# Patient Record
Sex: MALE | Race: WHITE | URBAN - METROPOLITAN AREA
[De-identification: names, ages, dates, MRNs, and addresses within clinical notes are randomized per-mention and may not be internally consistent; named-entity substitution may affect disease eponyms.]

---

## 2022-05-11 ENCOUNTER — APPOINTMENT (OUTPATIENT)
Dept: GENERAL RADIOLOGY | Age: 57
End: 2022-05-11
Payer: MEDICAID

## 2022-05-11 ENCOUNTER — HOSPITAL ENCOUNTER (EMERGENCY)
Age: 57
Discharge: HOME | End: 2022-05-12
Attending: EMERGENCY MEDICINE
Payer: MEDICAID

## 2022-05-11 VITALS
HEIGHT: 72 IN | SYSTOLIC BLOOD PRESSURE: 143 MMHG | HEART RATE: 59 BPM | BODY MASS INDEX: 41.24 KG/M2 | OXYGEN SATURATION: 97 % | RESPIRATION RATE: 16 BRPM | DIASTOLIC BLOOD PRESSURE: 85 MMHG | WEIGHT: 304.5 LBS | TEMPERATURE: 98.4 F

## 2022-05-11 DIAGNOSIS — R73.9 HYPERGLYCEMIA: Primary | ICD-10-CM

## 2022-05-11 LAB
A/G RATIO: 1.7 (ref 1.1–2.2)
ALBUMIN SERPL-MCNC: 4.5 G/DL (ref 3.4–5)
ALP BLD-CCNC: 80 U/L (ref 40–129)
ALT SERPL-CCNC: 20 U/L (ref 10–40)
ANION GAP SERPL CALCULATED.3IONS-SCNC: 12 MMOL/L (ref 3–16)
AST SERPL-CCNC: 17 U/L (ref 15–37)
BASE EXCESS VENOUS: 0.4 MMOL/L (ref -3–3)
BASOPHILS ABSOLUTE: 0 K/UL (ref 0–0.2)
BASOPHILS RELATIVE PERCENT: 0.5 %
BETA-HYDROXYBUTYRATE: 0.1 MMOL/L (ref 0–0.27)
BILIRUB SERPL-MCNC: 0.6 MG/DL (ref 0–1)
BILIRUBIN URINE: NEGATIVE
BLOOD, URINE: NEGATIVE
BUN BLDV-MCNC: 12 MG/DL (ref 7–20)
CALCIUM SERPL-MCNC: 9.7 MG/DL (ref 8.3–10.6)
CARBOXYHEMOGLOBIN: 1.5 % (ref 0–1.5)
CHLORIDE BLD-SCNC: 95 MMOL/L (ref 99–110)
CHP ED QC CHECK: NORMAL
CLARITY: CLEAR
CO2: 24 MMOL/L (ref 21–32)
COLOR: YELLOW
CREAT SERPL-MCNC: 0.8 MG/DL (ref 0.9–1.3)
EOSINOPHILS ABSOLUTE: 0 K/UL (ref 0–0.6)
EOSINOPHILS RELATIVE PERCENT: 0.7 %
GFR AFRICAN AMERICAN: >60
GFR NON-AFRICAN AMERICAN: >60
GLUCOSE BLD-MCNC: 419 MG/DL
GLUCOSE BLD-MCNC: 419 MG/DL (ref 70–99)
GLUCOSE BLD-MCNC: 431 MG/DL (ref 70–99)
GLUCOSE URINE: >=1000 MG/DL
HCO3 VENOUS: 26 MMOL/L (ref 23–29)
HCT VFR BLD CALC: 52.3 % (ref 40.5–52.5)
HEMOGLOBIN: 17.9 G/DL (ref 13.5–17.5)
KETONES, URINE: NEGATIVE MG/DL
LEUKOCYTE ESTERASE, URINE: NEGATIVE
LIPASE: 44 U/L (ref 13–60)
LYMPHOCYTES ABSOLUTE: 1.4 K/UL (ref 1–5.1)
LYMPHOCYTES RELATIVE PERCENT: 20.1 %
MCH RBC QN AUTO: 29.7 PG (ref 26–34)
MCHC RBC AUTO-ENTMCNC: 34.2 G/DL (ref 31–36)
MCV RBC AUTO: 86.7 FL (ref 80–100)
METHEMOGLOBIN VENOUS: 0.1 %
MICROSCOPIC EXAMINATION: ABNORMAL
MONOCYTES ABSOLUTE: 0.6 K/UL (ref 0–1.3)
MONOCYTES RELATIVE PERCENT: 8 %
NEUTROPHILS ABSOLUTE: 4.9 K/UL (ref 1.7–7.7)
NEUTROPHILS RELATIVE PERCENT: 70.7 %
NITRITE, URINE: NEGATIVE
O2 CONTENT, VEN: 25 VOL %
O2 SAT, VEN: 98 %
O2 THERAPY: ABNORMAL
PCO2, VEN: 43.9 MMHG (ref 40–50)
PDW BLD-RTO: 13 % (ref 12.4–15.4)
PERFORMED ON: ABNORMAL
PH UA: 5.5 (ref 5–8)
PH VENOUS: 7.38 (ref 7.35–7.45)
PLATELET # BLD: 190 K/UL (ref 135–450)
PMV BLD AUTO: 9.6 FL (ref 5–10.5)
PO2, VEN: 88.9 MMHG (ref 25–40)
POTASSIUM SERPL-SCNC: 4.5 MMOL/L (ref 3.5–5.1)
PROTEIN UA: NEGATIVE MG/DL
RBC # BLD: 6.03 M/UL (ref 4.2–5.9)
SODIUM BLD-SCNC: 131 MMOL/L (ref 136–145)
SPECIFIC GRAVITY UA: >=1.03 (ref 1–1.03)
TCO2 CALC VENOUS: 61 MMOL/L
TOTAL PROTEIN: 7.2 G/DL (ref 6.4–8.2)
TROPONIN: <0.01 NG/ML
URINE REFLEX TO CULTURE: ABNORMAL
URINE TYPE: ABNORMAL
UROBILINOGEN, URINE: 0.2 E.U./DL
WBC # BLD: 7 K/UL (ref 4–11)

## 2022-05-11 PROCEDURE — 84484 ASSAY OF TROPONIN QUANT: CPT

## 2022-05-11 PROCEDURE — 83690 ASSAY OF LIPASE: CPT

## 2022-05-11 PROCEDURE — 82803 BLOOD GASES ANY COMBINATION: CPT

## 2022-05-11 PROCEDURE — 71045 X-RAY EXAM CHEST 1 VIEW: CPT

## 2022-05-11 PROCEDURE — 99285 EMERGENCY DEPT VISIT HI MDM: CPT

## 2022-05-11 PROCEDURE — 2580000003 HC RX 258: Performed by: PHYSICIAN ASSISTANT

## 2022-05-11 PROCEDURE — 36415 COLL VENOUS BLD VENIPUNCTURE: CPT

## 2022-05-11 PROCEDURE — 80053 COMPREHEN METABOLIC PANEL: CPT

## 2022-05-11 PROCEDURE — 93005 ELECTROCARDIOGRAM TRACING: CPT | Performed by: PHYSICIAN ASSISTANT

## 2022-05-11 PROCEDURE — 85025 COMPLETE CBC W/AUTO DIFF WBC: CPT

## 2022-05-11 PROCEDURE — 81003 URINALYSIS AUTO W/O SCOPE: CPT

## 2022-05-11 PROCEDURE — 96360 HYDRATION IV INFUSION INIT: CPT

## 2022-05-11 PROCEDURE — 82010 KETONE BODYS QUAN: CPT

## 2022-05-11 RX ORDER — 0.9 % SODIUM CHLORIDE 0.9 %
2000 INTRAVENOUS SOLUTION INTRAVENOUS ONCE
Status: COMPLETED | OUTPATIENT
Start: 2022-05-11 | End: 2022-05-12

## 2022-05-11 RX ADMIN — SODIUM CHLORIDE 2000 ML: 9 INJECTION, SOLUTION INTRAVENOUS at 23:01

## 2022-05-11 ASSESSMENT — PAIN - FUNCTIONAL ASSESSMENT: PAIN_FUNCTIONAL_ASSESSMENT: 0-10

## 2022-05-11 ASSESSMENT — ENCOUNTER SYMPTOMS
RHINORRHEA: 0
ABDOMINAL PAIN: 0
WHEEZING: 0
NAUSEA: 1
SHORTNESS OF BREATH: 0
DIARRHEA: 0
VOMITING: 0
COUGH: 0

## 2022-05-12 LAB
EKG ATRIAL RATE: 60 BPM
EKG DIAGNOSIS: NORMAL
EKG P AXIS: 16 DEGREES
EKG P-R INTERVAL: 120 MS
EKG Q-T INTERVAL: 414 MS
EKG QRS DURATION: 88 MS
EKG QTC CALCULATION (BAZETT): 414 MS
EKG R AXIS: -12 DEGREES
EKG T AXIS: 33 DEGREES
EKG VENTRICULAR RATE: 60 BPM

## 2022-05-12 PROCEDURE — 93010 ELECTROCARDIOGRAM REPORT: CPT | Performed by: INTERNAL MEDICINE

## 2022-05-12 NOTE — ED PROVIDER NOTES
[address] [facility]    Physician Attestation    Pt Name: Radha Lopez  MRN: [id number]  [place] 1965  Date of evaluation: 5/11/22        Physician Note:    I havepersonally performed and/or participated in the history, exam and medical decision making and agree with all pertinent clinical information. I have also reviewed and agree with the past medical, family and social historyunless otherwise noted. I have personally performed a face to face diagnostic evaluation onthis patient. I have reviewed the mid-levels findings and agree. History: Patient sugars been running high for 2 days he takes insulin 3 times a day denies any nausea or vomiting sugar initially about 440 but no anion gap      REVIEW OF SYSTEMS    Constitutional:  Denies fever, chills, or weakness   Eyes:  Denies vision changes  HENT:  Denies sore throat or neck pain   Respiratory:  Denies cough or shortness of breath   Cardiovascular:  Denies chest pain  GI:  Denies abdominal pain, nausea, vomiting, or diarrhea   Musculoskeletal:  Denies back pain   Skin: no rash or vesicles   Neurologic:  no headache weakness focal    Lymphatic:  no swollen  nodes   Psychiatric: no si or hs thoughts     All systems negative except as marked. General Appearance:  Alert, cooperative, no distress, appears stated age. Head:  Normocephalic, without obvious abnormality, atraumatic. Eyes:  conjunctiva/corneas clear, EOM's intact. Sclera anicteric. ENT: Mucous membranes moist.   Neck: Supple, symmetrical, trachea midline, no adenopathy. No jugular venous distention. Lungs:   No Respiratory Distress. no rales  rhonchi rub   Chest Wall:  Nontender  no deformity   Heart:  Rsr no murmer gallop    Abdomen:   Soft nontender no organomegally    Extremities:  Full range of motion. no deformity   Pulses: Equal  upper and lower    Skin:  No rashes or lesions to exposed skin. Neurologic: Alert and oriented X 3. Motor grossly normal.  Speech clear. Cr n 2-12 intact   Anion gap patient was given IV fluids and we will recheck his sugar  2 L of fluids sugar is 309 patient will take his insulin when he gets home he was discharged  EKG Interpretation    Interpreted by emergency department physician    Rhythm: normal sinus   Rate: normal  Axis: normal  Ectopy: none  Conduction: normal  ST Segments: no acute change  T Waves: no acute change  Q Waves: none    Clinical Impression: Normal sinus rhythm    Ankit Aragon MD  She received IV fluids and his sugar improved  1. Hyperglycemia          DISPOSITION/PLAN  PATIENT REFERRED TO:  No follow-up provider specified.   DISCHARGE MEDICATIONS:  New Prescriptions    No medications on file         MD Ankit Hidalgo MD  05/12/22 0009       Ankit Aragon MD  05/12/22 0043

## 2022-05-12 NOTE — ED NOTES
Discharge and education instructions reviewed. Patient verbalized understanding, teach-back successful. Patient denied questions at this time. No acute distress noted. Patient instructed to follow-up as noted - return to emergency department if symptoms worsen. Patient verbalized understanding. Discharged per EDMD with discharged instructions.        Cal Padilla RN  05/12/22 8324

## 2022-05-12 NOTE — ED PROVIDER NOTES
[address]        Pt Name: Luke Acevedo  MRN: [id number]  [place] 1965  Date of evaluation: 5/11/2022  Provider: Tresa Weir PA-C  PCP: Patrice Esquivel DO  Note Started: 10:56 PM EDT        I have seen and evaluated this patient with my supervising physician Lissa Mauro MD.    [facility]       Chief Complaint   Patient presents with    Hyperglycemia     Pt saw that his BG was above 400, called Dr today at 1800 hrs and advised to go to the ER, took 20 units humalog at 1300 hrs, c/o nausea and dizziness and excess urination       HISTORY OF PRESENT ILLNESS   (Location, Timing/Onset, Context/Setting, Quality, Duration, Modifying Factors, Severity, Associated Signs and Symptoms)  Note limiting factors. Chief Complaint: Hyperglycemia     Luke Acevedo is a 55 y.o. male who presents for evaluation of hyperglycemia. Patient does have history of diabetes and states his sugar is above 400 today. He called his doctor and was told to go to ER. He self administered 20 units of Humalog this afternoon prior to coming to the ER. Patient states he feels nauseous and a little dizzy/lightheaded but no true spinning type sensation. No focal weakness, visual disturbance or syncope. He has had increased thirst and excess urination as well. No dysuria or hematuria. No abdominal pain. No vomiting or diarrhea. He has no other complaints or concerns at this time. Nursing Notes were all reviewed and agreed with or any disagreements were addressed in the HPI. REVIEW OF SYSTEMS    (2-9 systems for level 4, 10 or more for level 5)     Review of Systems   Constitutional: Negative for appetite change, chills and fever. HENT: Negative for congestion and rhinorrhea. Respiratory: Negative for cough, shortness of breath and wheezing. Cardiovascular: Negative for chest pain.    Gastrointestinal: Positive for nausea. Negative for abdominal pain, diarrhea and vomiting. Genitourinary: Negative for difficulty urinating, dysuria and hematuria. Musculoskeletal: Negative for neck pain and neck stiffness. Skin: Negative for rash. Neurological: Positive for dizziness. Negative for headaches. Positives and Pertinent negatives as per HPI. Except as noted above in the ROS, all other systems were reviewed and negative. PAST MEDICAL HISTORY     Past Medical History:   Diagnosis Date    Chronic back pain     Depression     Diabetes mellitus ([facility] [address]     Morbid obesity (BMI 40.0 or higher)     TWIN (obstructive sleep apnea)     Urinary incontinence          SURGICAL HISTORY     Past Surgical History:   Procedure Laterality Date    CHOLECYSTECTOMY      VASECTOMY           CURRENTMEDICATIONS       Previous Medications    DULOXETINE (CYMBALTA) 60 MG CAPSULE    Take 60 mg by mouth daily. EXENATIDE (BYETTA 10 MCG PEN) 250 MCG/ML INJECTION    Inject 5 mcg into the skin 2 times daily (with meals). HYDROCODONE-ACETAMINOPHEN (LORCET)  MG PER TABLET    Take 1 tablet by mouth every 6 hours as needed. IBUPROFEN (ADVIL;MOTRIN) 800 MG TABLET    Take 800 mg by mouth every 6 hours as needed. PHENTERMINE (ADIPEX-P) 37.5 MG CAPSULE    Take 37.5 mg by mouth every morning. ROSIGLITAZONE-METFORMIN (AVANDAMET) 2-500 MG PER TABLET    Take 1 tablet by mouth 2 times daily (with meals). ALLERGIES     Patient has no known allergies.     FAMILYHISTORY       Family History   Problem Relation Age of Onset    Diabetes Other     Obesity Other     Diabetes Mother     Cancer Mother         Ovarian    Depression Mother     High Blood Pressure Father     Diabetes Maternal Grandmother     Diabetes Maternal Grandfather           SOCIAL HISTORY       Social History     Tobacco Use    Smoking status: Never Smoker    Smokeless tobacco: Never Used   Substance Use Topics    Alcohol use: Yes     Comment: socially    Drug use: No       SCREENINGS    Veronica Coma Scale  Eye Opening: Spontaneous  Best Verbal Response: Oriented  Best Motor Response: Obeys commands  Veronica Coma Scale Score: 15        PHYSICAL EXAM    (up to 7 for level 4, 8 or more for level 5)     ED Triage Vitals [05/11/22 2148]   BP Temp Temp Source Pulse Resp SpO2 Height Weight   (!) 143/85 98.4 °F (36.9 °C) Oral 59 16 97 % 6' (1.829 m) (!) 304 lb 8 oz (138.1 kg)       Physical Exam  Vitals and nursing note reviewed. Constitutional:       Appearance: He is well-developed. He is not diaphoretic. HENT:      Head: Normocephalic and atraumatic. Right Ear: External ear normal.      Left Ear: External ear normal.      Nose: Nose normal.   Eyes:      General:         Right eye: No discharge. Left eye: No discharge. Cardiovascular:      Rate and Rhythm: Normal rate and regular rhythm. Heart sounds: Normal heart sounds. Pulmonary:      Effort: Pulmonary effort is normal. No respiratory distress. Breath sounds: Normal breath sounds. Chest:      Chest wall: No tenderness. Abdominal:      General: There is no distension. Palpations: Abdomen is soft. Tenderness: There is no abdominal tenderness. Musculoskeletal:         General: Normal range of motion. Cervical back: Normal range of motion and neck supple. Skin:     General: Skin is warm and dry. Neurological:      Mental Status: He is alert and oriented to person, place, and time.    Psychiatric:         Behavior: Behavior normal.         DIAGNOSTIC RESULTS   LABS:    Labs Reviewed   CBC WITH AUTO DIFFERENTIAL - Abnormal; Notable for the following components:       Result Value    RBC 6.03 (*)     Hemoglobin 17.9 (*)     All other components within normal limits   COMPREHENSIVE METABOLIC PANEL - Abnormal; Notable for the following components:    Sodium 131 (*)     Chloride 95 (*)     Glucose 431 (*)     CREATININE 0.8 (*)     All other components within normal limits URINALYSIS WITH REFLEX TO CULTURE - Abnormal; Notable for the following components:    Glucose, Ur >=1000 (*)     All other components within normal limits   BLOOD GAS, VENOUS - Abnormal; Notable for the following components:    pO2, Yoseph 88.9 (*)     All other components within normal limits   POCT GLUCOSE - Abnormal; Notable for the following components:    POC Glucose 419 (*)     All other components within normal limits   POCT GLUCOSE - Normal   LIPASE   TROPONIN   BETA-HYDROXYBUTYRATE       When ordered only abnormal lab results are displayed. All other labs were within normal range or not returned as of this dictation. EKG: When ordered, EKG's are interpreted by the Emergency Department Physician in the absence of a cardiologist.  Please see their note for interpretation of EKG. RADIOLOGY:   Non-plain film images such as CT, Ultrasound and MRI are read by the radiologist. Plain radiographic images are visualized and preliminarily interpreted by the ED Provider with the below findings:        Interpretation per the Radiologist below, if available at the time of this note:    XR CHEST PORTABLE   Final Result   Enlargement of the cardiac silhouette which may be somewhat technique related. No acute infiltrate. XR CHEST PORTABLE    Result Date: 5/11/2022  EXAMINATION: ONE XRAY VIEW OF THE CHEST 5/11/2022 9:58 pm COMPARISON: 08/02/2007 HISTORY: ORDERING SYSTEM PROVIDED HISTORY: SOB TECHNOLOGIST PROVIDED HISTORY: Reason for exam:->SOB Reason for Exam: SOB FINDINGS: Mild cardiomegaly, though potentially technique related. No pneumothorax. No focal consolidation or pleural effusion. No acute cardiopulmonary process. No acute osseous abnormality is identified. Enlargement of the cardiac silhouette which may be somewhat technique related. No acute infiltrate.            PROCEDURES   Unless otherwise noted below, none     Procedures    CRITICAL CARE TIME       CONSULTS:  None      EMERGENCY DEPARTMENT COURSE and DIFFERENTIAL DIAGNOSIS/MDM:   Vitals:    Vitals:    05/11/22 2148   BP: (!) 143/85   Pulse: 59   Resp: 16   Temp: 98.4 °F (36.9 °C)   TempSrc: Oral   SpO2: 97%   Weight: (!) 304 lb 8 oz (138.1 kg)   Height: 6' (1.829 m)       Patient was given the following medications:  Medications   0.9 % sodium chloride bolus (2,000 mLs IntraVENous New Bag 5/11/22 2301)         Is this patient to be included in the SEP-1 Core Measure due to severe sepsis or septic shock? No   Exclusion criteria - the patient is NOT to be included for SEP-1 Core Measure due to: Infection is not suspected    Patient presents for evaluation of hyperglycemia. On exam, he is resting comfortably in bed no acute distress and nontoxic. Vitals are stable and he is afebrile. Lungs are clear to auscultation bilaterally, chest is nontender and abdomen is benign. Point-of-care glucose was 419. Patient was started on aggressive fluid resuscitation will be reevaluated. Please see attending note for EKG interpretation. CBC and CMP are remarkable for hyperglycemia with no evidence of diabetic ketoacidosis. No leukocytosis. Troponin negative. Lipase 44. VBG shows pH of 7.38. Chest x-ray is negative. Repeat glucose and reevaluation pending at end of shift. Please see attending note for additional information regarding these results and patient disposition. FINAL IMPRESSION      1. Hyperglycemia          DISPOSITION/PLAN   DISPOSITION        PATIENT REFERRED TO:  No follow-up provider specified.     DISCHARGE MEDICATIONS:  New Prescriptions    No medications on file       DISCONTINUED MEDICATIONS:  Discontinued Medications    No medications on file              (Please note that portions of this note were completed with a voice recognition program.  Efforts were made to edit the dictations but occasionally words are mis-transcribed.)    Eugene Mckeon PA-C (electronically signed)    [address]  05/11/22 9460

## 2024-02-01 ENCOUNTER — HOSPITAL ENCOUNTER (EMERGENCY)
Age: 59
Discharge: HOME | End: 2024-02-01
Payer: MEDICAID

## 2024-02-01 VITALS
BODY MASS INDEX: 42.66 KG/M2 | OXYGEN SATURATION: 97 % | HEART RATE: 85 BPM | HEIGHT: 72 IN | WEIGHT: 315 LBS | TEMPERATURE: 98.3 F | DIASTOLIC BLOOD PRESSURE: 77 MMHG | SYSTOLIC BLOOD PRESSURE: 156 MMHG | RESPIRATION RATE: 18 BRPM

## 2024-02-01 DIAGNOSIS — L08.9 INFECTED ABRASION OF RIGHT FOOT, INITIAL ENCOUNTER: ICD-10-CM

## 2024-02-01 DIAGNOSIS — S90.811A INFECTED ABRASION OF RIGHT FOOT, INITIAL ENCOUNTER: ICD-10-CM

## 2024-02-01 DIAGNOSIS — L03.90 CELLULITIS, UNSPECIFIED CELLULITIS SITE: Primary | ICD-10-CM

## 2024-02-01 PROCEDURE — 90471 IMMUNIZATION ADMIN: CPT | Performed by: PHYSICIAN ASSISTANT

## 2024-02-01 PROCEDURE — 6360000002 HC RX W HCPCS: Performed by: PHYSICIAN ASSISTANT

## 2024-02-01 PROCEDURE — 6370000000 HC RX 637 (ALT 250 FOR IP): Performed by: PHYSICIAN ASSISTANT

## 2024-02-01 PROCEDURE — 99284 EMERGENCY DEPT VISIT MOD MDM: CPT

## 2024-02-01 PROCEDURE — 90714 TD VACC NO PRESV 7 YRS+ IM: CPT | Performed by: PHYSICIAN ASSISTANT

## 2024-02-01 RX ORDER — INSULIN GLARGINE 300 U/ML
INJECTION, SOLUTION SUBCUTANEOUS
COMMUNITY

## 2024-02-01 RX ORDER — TIRZEPATIDE 5 MG/.5ML
5 INJECTION, SOLUTION SUBCUTANEOUS WEEKLY
COMMUNITY

## 2024-02-01 RX ORDER — DOXYCYCLINE HYCLATE 100 MG
100 TABLET ORAL ONCE
Status: COMPLETED | OUTPATIENT
Start: 2024-02-01 | End: 2024-02-01

## 2024-02-01 RX ORDER — CELECOXIB 200 MG/1
200 CAPSULE ORAL 2 TIMES DAILY
COMMUNITY

## 2024-02-01 RX ORDER — TRAZODONE HYDROCHLORIDE 100 MG/1
100 TABLET ORAL NIGHTLY
COMMUNITY

## 2024-02-01 RX ORDER — INSULIN LISPRO 100 [IU]/ML
1-10 INJECTION, SOLUTION INTRAVENOUS; SUBCUTANEOUS
COMMUNITY

## 2024-02-01 RX ORDER — GALCANEZUMAB 120 MG/ML
120 INJECTION, SOLUTION SUBCUTANEOUS
COMMUNITY

## 2024-02-01 RX ADMIN — DOXYCYCLINE HYCLATE 100 MG: 100 TABLET, COATED ORAL at 21:35

## 2024-02-01 RX ADMIN — CLOSTRIDIUM TETANI TOXOID ANTIGEN (FORMALDEHYDE INACTIVATED) AND CORYNEBACTERIUM DIPHTHERIAE TOXOID ANTIGEN (FORMALDEHYDE INACTIVATED) 0.5 ML: 5; 2 INJECTION, SUSPENSION INTRAMUSCULAR at 21:36

## 2024-02-01 ASSESSMENT — ENCOUNTER SYMPTOMS
SHORTNESS OF BREATH: 0
NAUSEA: 0
VOMITING: 0
ABDOMINAL PAIN: 0
BACK PAIN: 0
COLOR CHANGE: 1

## 2024-02-01 ASSESSMENT — PAIN SCALES - GENERAL: PAINLEVEL_OUTOF10: 2

## 2024-02-01 ASSESSMENT — PAIN - FUNCTIONAL ASSESSMENT: PAIN_FUNCTIONAL_ASSESSMENT: 0-10

## 2024-02-01 ASSESSMENT — PAIN DESCRIPTION - LOCATION: LOCATION: FOOT

## 2024-02-01 ASSESSMENT — PAIN DESCRIPTION - ORIENTATION: ORIENTATION: RIGHT

## 2024-02-02 NOTE — ED PROVIDER NOTES
[facility] EMERGENCY DEPARTMENT  EMERGENCY DEPARTMENT ENCOUNTER        Pt Name: Kris Guido  MRN: [id number]  Birthdate 1965  Date of evaluation: 2/1/2024  Provider: Rajendra Gonzalez PA-C  PCP: Tony Tavarez DO  Note Started: 9:28 PM EST 2/1/24      HÉCTOR. I have evaluated this patient.        CHIEF COMPLAINT       Chief Complaint   Patient presents with    Foot Injury     Pt arrives to ED via self c/o right foot pain and swelling, pt states he scraped his foot a few days ago and it has become infected. Pt hx T2DM       HISTORY OF PRESENT ILLNESS: 1 or more Elements     History from : Patient    Limitations to history : None    Kris Guido is a 58 y.o. male who presents to the emergency department stating that several days ago he accidentally scraped his right foot.  He is not entirely sure what he scraped it on.  He does not believe his tetanus is up-to-date.  The wound is not actively bleeding.  However, over the past few days he has noticed some swelling and redness around the wound and is concerned that it may be becoming infected.  He is a type II diabetic.  His glucose readings have been stable.  His glucose is currently 167 on his glucometer.  He is able to bear weight and ambulate without difficulty.  He only rates his discomfort to be a 2 out of 10 on pain scale.  He denies acute numbness, tingling or weakness.  He walked into the emergency department with steady gait.    Nursing Notes were all reviewed and agreed with or any disagreements were addressed in the HPI.    REVIEW OF SYSTEMS :      Review of Systems   Constitutional:  Negative for chills and fever.   HENT: Negative.     Eyes:  Negative for visual disturbance.   Respiratory:  Negative for shortness of breath.    Cardiovascular:  Negative for chest pain.   Gastrointestinal:  Negative for abdominal pain, nausea and vomiting.   Endocrine: Negative.    Musculoskeletal:  Positive for myalgias.